# Patient Record
Sex: FEMALE | Race: WHITE | Employment: FULL TIME | URBAN - METROPOLITAN AREA
[De-identification: names, ages, dates, MRNs, and addresses within clinical notes are randomized per-mention and may not be internally consistent; named-entity substitution may affect disease eponyms.]

---

## 2017-06-08 ENCOUNTER — HOSPITAL ENCOUNTER (OUTPATIENT)
Dept: MAMMOGRAPHY | Age: 42
Discharge: HOME | End: 2017-06-08
Attending: FAMILY MEDICINE
Payer: COMMERCIAL

## 2017-06-08 DIAGNOSIS — N63.20 LEFT BREAST LUMP: ICD-10-CM

## 2017-06-08 PROCEDURE — 76642 ULTRASOUND BREAST LIMITED: CPT

## 2017-06-08 PROCEDURE — 77065 DX MAMMO INCL CAD UNI: CPT

## 2017-06-09 NOTE — PROGRESS NOTES
Breast studies are fine. Radiology mentioned consideration of MRI next study due to family history. I am however not sure the breast biopsy clips from the previous biopsies are allowed in a MRI. We will need to investigate as the time comes.

## 2020-05-22 ENCOUNTER — HOSPITAL ENCOUNTER (OUTPATIENT)
Dept: LAB | Age: 45
Discharge: HOME | End: 2020-05-22

## 2020-05-22 PROCEDURE — 88305 TISSUE EXAM BY PATHOLOGIST: CPT

## 2022-08-15 LAB
AVERAGE GLUCOSE: NORMAL
HBA1C MFR BLD: 5.2 %